# Patient Record
Sex: FEMALE | Race: WHITE | NOT HISPANIC OR LATINO | URBAN - METROPOLITAN AREA
[De-identification: names, ages, dates, MRNs, and addresses within clinical notes are randomized per-mention and may not be internally consistent; named-entity substitution may affect disease eponyms.]

---

## 2024-08-16 ENCOUNTER — OFFICE VISIT (OUTPATIENT)
Dept: URGENT CARE | Facility: CLINIC | Age: 24
End: 2024-08-16
Payer: COMMERCIAL

## 2024-08-16 VITALS
DIASTOLIC BLOOD PRESSURE: 88 MMHG | OXYGEN SATURATION: 99 % | SYSTOLIC BLOOD PRESSURE: 136 MMHG | HEART RATE: 96 BPM | HEIGHT: 62 IN | RESPIRATION RATE: 16 BRPM | TEMPERATURE: 98.8 F | BODY MASS INDEX: 39.56 KG/M2 | WEIGHT: 215 LBS

## 2024-08-16 DIAGNOSIS — L73.9 FOLLICULITIS: Primary | ICD-10-CM

## 2024-08-16 PROCEDURE — 99213 OFFICE O/P EST LOW 20 MIN: CPT | Performed by: PHYSICIAN ASSISTANT

## 2024-08-16 RX ORDER — MUPIROCIN 20 MG/G
OINTMENT TOPICAL 3 TIMES DAILY
Qty: 30 G | Refills: 0 | Status: SHIPPED | OUTPATIENT
Start: 2024-08-16

## 2024-08-16 RX ORDER — ESCITALOPRAM OXALATE 10 MG/1
10 TABLET ORAL DAILY
COMMUNITY
Start: 2024-05-22

## 2024-08-16 RX ORDER — ESCITALOPRAM OXALATE 5 MG/1
TABLET ORAL
COMMUNITY
Start: 2024-05-28

## 2024-08-16 NOTE — PATIENT INSTRUCTIONS
Patient Education     Bacterial folliculitis   The Basics   Written by the doctors and editors at [facility]   What is folliculitis? -- This is a skin problem that happens when a hair follicle gets infected (figure 1). A hair follicle is a sac under the skin where a hair starts to grow. Usually, folliculitis happens because bacteria (a kind of germ) get into the hair follicle. Other times, folliculitis is caused by a fungus or virus in the hair follicle, or because of another reason.  What are the symptoms of bacterial folliculitis? -- The main symptom is small, raised bumps on the skin. The color of the bumps depends on your skin tone. They can be different shades of red, pink, or brown. They can even be your normal skin color. The bumps can be tender or itchy, and they might have pus in them.  Will I need tests? -- Not usually. To make sure that you do not have another skin condition, your doctor or nurse will ask about your symptoms and do an exam. If it is hard to tell what is causing your folliculitis, they might test a sample of pus, or do a different test.  What can I do on my own? -- You can:   Wet a clean washcloth with warm water, and put it on the bumps. When the cloth cools, wet it with warm water again and put it back on the area. Repeat these steps for 10 to 15 minutes, 3 times a day.   Avoid shaving the area that has folliculitis. That will irritate it more and might spread the infection.   Wear loose-fitting clothing, especially when you exercise or sweat. This might help prevent getting bacterial folliculitis again. Some people also find it helpful to use an antibacterial soap or body wash.  What other treatment might I have? -- If your bacterial folliculitis does not go away on its own, your doctor might prescribe an antibiotic to put on your skin. This could be a cream, ointment, or liquid. If a lot of your skin is affected, you might need an antibiotic pill. But most of the time, folliculitis gets  better without treatment.  When should I call the doctor? -- Call your doctor or nurse if:   The folliculitis does not go away after you treat it at home.   The bumps get larger or more painful.   The bumps go away but then come back.   You get a fever.  All topics are updated as new evidence becomes available and our peer review process is complete.  This topic retrieved from [employer] on: May 18, 2024.  Topic 42165 Version 8.0  Release: 32.4.3 - C32.137  © 2024 UpToDate, Inc. and/or its affiliates. All rights reserved.  figure 1: Infected hair follicle     Graphic 66345 Version 1.0  Consumer Information Use and Disclaimer   Disclaimer: This generalized information is a limited summary of diagnosis, treatment, and/or medication information. It is not meant to be comprehensive and should be used as a tool to help the user understand and/or assess potential diagnostic and treatment options. It does NOT include all information about conditions, treatments, medications, side effects, or risks that may apply to a specific patient. It is not intended to be medical advice or a substitute for the medical advice, diagnosis, or treatment of a health care provider based on the health care provider's examination and assessment of a patient's specific and unique circumstances. Patients must speak with a health care provider for complete information about their health, medical questions, and treatment options, including any risks or benefits regarding use of medications. This information does not endorse any treatments or medications as safe, effective, or approved for treating a specific patient. UpToDate, Inc. and its affiliates disclaim any warranty or liability relating to this information or the use thereof.The use of this information is governed by the Terms of Use, available at https://www.[employer].com/en/know/clinical-effectiveness-terms. 2024© UpToDate, Inc. and its affiliates and/or licensors. All rights  reserved.  Copyright   © 2024 UpToDate, Inc. and/or its affiliates. All rights reserved.    Folliculitis:   -We discussed likely folliculitis from the heat and sweating while camping. Will attempt topical treatment with benzoyl peroxide wash and bactroban as discussed. Possible need for oral antibiotic like keflex or dicloxacillin if no improvement or spread of the rash.   - Reduction of predisposing factors for folliculitis  like wearing wearing loose-fitting clothing, particularly during exercise, and changing shirts after activities with excessive sweating seem to be helpful.   -You can use your cerave or cetaphil moisturizer and facial washes. Avoid new products at this time.   -Follow up immediately if worsening or persistent sx with your PCP.

## 2024-08-16 NOTE — PROGRESS NOTES
[facility]        NAME: Marlsy Alonso is a 24 y.o. female  : 2000    MRN: [id number]  DATE: 2024  TIME: 1:13 PM    Assessment and Plan   Folliculitis [L73.9]  1. Folliculitis  mupirocin (BACTROBAN) 2 % ointment            Patient Instructions   Folliculitis:   -We discussed likely folliculitis from the heat and sweating while camping. Will attempt topical treatment with benzoyl peroxide wash and bactroban as discussed. Possible need for oral antibiotic like keflex or dicloxacillin if no improvement or spread of the rash.   - Reduction of predisposing factors for folliculitis  like wearing wearing loose-fitting clothing, particularly during exercise, and changing shirts after activities with excessive sweating seem to be helpful.   -You can use your cerave or cetaphil moisturizer and facial washes. Avoid new products at this time.   -Follow up immediately if worsening or persistent sx with your PCP.     Follow up with PCP in 3-5 days.  Proceed to  ER if symptoms worsen.    If tests have been performed at [facility], our office will contact you with results if changes need to be made to the care plan discussed with you at the visit.  [facility]    Chief Complaint     Chief Complaint   Patient presents with   • Rash     Pt states she started a rash 2 days ago on her chest and face.          History of Present Illness       The patient is a 24-year-old female who presents today for rash over the facial area and chest x 2 days. She states that there are small, erythematous pustules. The lesions are itchy, there is no pain or tenderness. The patient was camping all last week, the rash began the day after she returned. She was in the sun a lot daily and was sweating copiously. There is no oozing or drainage. She denies new lotions, detergents, facial products, medications. No recent antibiotic use. No URI sx or sick contacts. No OTC measures.  "      Review of Systems   Review of Systems   Constitutional:  Negative for activity change, appetite change, chills, diaphoresis, fatigue and fever.   HENT:  Negative for facial swelling, sore throat and trouble swallowing.    Respiratory:  Negative for chest tightness, shortness of breath, wheezing and stridor.    Cardiovascular:  Negative for chest pain and palpitations.   Skin:  Positive for rash.   Allergic/Immunologic: Negative for environmental allergies, food allergies and immunocompromised state.   Neurological:  Negative for dizziness and light-headedness.   Hematological:  Negative for adenopathy. Does not bruise/bleed easily.         Current Medications       Current Outpatient Medications:   •  escitalopram (LEXAPRO) 10 mg tablet, Take 10 mg by mouth daily, Disp: , Rfl:   •  escitalopram (LEXAPRO) 5 mg tablet, TAKE 1 TABLET BY MOUTH ONCE DAILY (LEXAPRO DOSE IS 15MG), Disp: , Rfl:   •  mupirocin (BACTROBAN) 2 % ointment, Apply topically 3 (three) times a day, Disp: 30 g, Rfl: 0    Current Allergies     Allergies as of 08/16/2024 - Reviewed 08/16/2024   Allergen Reaction Noted   • Clindamycin Edema 08/16/2024            The following portions of the patient's history were reviewed and updated as appropriate: allergies, current medications, past family history, past medical history, past social history, past surgical history and problem list.     Past Medical History:   Diagnosis Date   • Asthma        Past Surgical History:   Procedure Laterality Date   • TONSILECTOMY AND ADNOIDECTOMY         History reviewed. No pertinent family history.      Medications have been verified.        Objective   /88   Pulse 96   Temp 98.8 °F (37.1 °C)   Resp 16   Ht 5' 2\" (1.575 m)   Wt 97.5 kg (215 lb)   SpO2 99%   BMI 39.32 kg/m²   No LMP recorded.       Physical Exam     Physical Exam  Vitals and nursing note reviewed.   Constitutional:       General: She is not in acute distress.     Appearance: She is " well-developed. She is not ill-appearing, toxic-appearing or diaphoretic.   HENT:      Mouth/Throat:      Mouth: Oropharynx is clear and moist and mucous membranes are normal.      Pharynx: Uvula midline.   Cardiovascular:      Rate and Rhythm: Normal rate and regular rhythm.      Pulses: Normal pulses.      Heart sounds: Normal heart sounds, S1 normal and S2 normal. No murmur heard.  Pulmonary:      Effort: Pulmonary effort is normal. No tachypnea, accessory muscle usage or respiratory distress.      Breath sounds: Normal breath sounds. No stridor. No decreased breath sounds, wheezing, rhonchi or rales.   Skin:     Capillary Refill: Capillary refill takes less than 2 seconds.      Findings: Rash present. Rash is maculopapular and pustular.      Comments: There are very small, 1-2mm erythematous pustules on the chest and facial area too numerous to count. No oozing or drainage. No tenderness. No vesicles.